# Patient Record
Sex: MALE | Race: WHITE | NOT HISPANIC OR LATINO | Employment: OTHER | URBAN - METROPOLITAN AREA
[De-identification: names, ages, dates, MRNs, and addresses within clinical notes are randomized per-mention and may not be internally consistent; named-entity substitution may affect disease eponyms.]

---

## 2017-11-11 ENCOUNTER — APPOINTMENT (EMERGENCY)
Dept: RADIOLOGY | Facility: HOSPITAL | Age: 77
End: 2017-11-11
Payer: MEDICARE

## 2017-11-11 ENCOUNTER — HOSPITAL ENCOUNTER (EMERGENCY)
Facility: HOSPITAL | Age: 77
Discharge: NURSING, REHAB OR LONG-TERM CARE | End: 2017-11-11
Attending: EMERGENCY MEDICINE | Admitting: EMERGENCY MEDICINE
Payer: MEDICARE

## 2017-11-11 VITALS
RESPIRATION RATE: 18 BRPM | HEART RATE: 102 BPM | OXYGEN SATURATION: 97 % | DIASTOLIC BLOOD PRESSURE: 74 MMHG | WEIGHT: 237.88 LBS | SYSTOLIC BLOOD PRESSURE: 135 MMHG | TEMPERATURE: 98.1 F

## 2017-11-11 DIAGNOSIS — S89.91XA INJURY OF RIGHT KNEE, INITIAL ENCOUNTER: Primary | ICD-10-CM

## 2017-11-11 PROCEDURE — 99284 EMERGENCY DEPT VISIT MOD MDM: CPT

## 2017-11-11 PROCEDURE — 73564 X-RAY EXAM KNEE 4 OR MORE: CPT

## 2017-11-11 RX ORDER — ACETAMINOPHEN 325 MG/1
650 TABLET ORAL EVERY 4 HOURS PRN
COMMUNITY

## 2017-11-11 RX ORDER — TAMSULOSIN HYDROCHLORIDE 0.4 MG/1
0.4 CAPSULE ORAL
COMMUNITY
End: 2018-06-25 | Stop reason: HOSPADM

## 2017-11-11 RX ORDER — AMLODIPINE BESYLATE 5 MG/1
5 TABLET ORAL DAILY
COMMUNITY

## 2017-11-11 RX ORDER — DOCUSATE SODIUM 100 MG/1
100 CAPSULE, LIQUID FILLED ORAL 2 TIMES DAILY
COMMUNITY

## 2017-11-11 RX ORDER — SENNA PLUS 8.6 MG/1
1 TABLET ORAL AS NEEDED
COMMUNITY

## 2017-11-11 RX ORDER — POLYETHYLENE GLYCOL 3350 17 G/17G
17 POWDER, FOR SOLUTION ORAL AS NEEDED
COMMUNITY

## 2017-11-11 RX ORDER — CITALOPRAM 10 MG/1
10 TABLET ORAL DAILY
COMMUNITY
End: 2018-06-25 | Stop reason: HOSPADM

## 2017-11-11 RX ORDER — PREDNISONE 1 MG/1
5 TABLET ORAL DAILY
COMMUNITY
End: 2018-06-25 | Stop reason: HOSPADM

## 2017-11-11 RX ORDER — COLCHICINE 0.6 MG/1
0.6 CAPSULE ORAL DAILY
COMMUNITY
End: 2018-06-25 | Stop reason: HOSPADM

## 2017-11-11 NOTE — ED PROVIDER NOTES
History  Chief Complaint   Patient presents with    Leg Injury     Aide at [facility] reports to EMS that patient has Hx of dementia and states tnat " he wanted to get out of there" and threw himself out of his chair onto the floor  Patient hit his R knee and leg, no LOC , no thinners  Pt  Got angry and threw himself out of the chair at the [facility]  C/o some R knee pain - has a hx of chronic R knee pain  He didn't hit his head, no headaches, no n/v   Pt  Is on xarelto  Prior to Admission Medications   Prescriptions Last Dose Informant Patient Reported? Taking?    Bisacodyl (DULCOLAX RE)   Yes Yes   Sig: Insert into the rectum daily as needed   Colchicine 0 6 MG CAPS   Yes Yes   Sig: Take 0 6 mg by mouth daily   Magnesium Hydroxide (MILK OF MAGNESIA PO)   Yes Yes   Sig: Take 30 mL by mouth as needed   acetaminophen (TYLENOL) 325 mg tablet   Yes Yes   Sig: Take 650 mg by mouth every 4 (four) hours as needed for mild pain   amLODIPine (NORVASC) 5 mg tablet   Yes Yes   Sig: Take 5 mg by mouth daily   citalopram (CeleXA) 10 mg tablet   Yes Yes   Sig: Take 10 mg by mouth daily   docusate sodium (COLACE) 100 mg capsule   Yes Yes   Sig: Take 100 mg by mouth 2 (two) times a day   glycerin, adult, (glycerin adult) 2 g rectal suppository   Yes Yes   Sig: Insert 1 suppository into the rectum as needed   polyethylene glycol (MIRALAX) 17 g packet   Yes Yes   Sig: Take 17 g by mouth as needed   predniSONE 5 mg tablet   Yes Yes   Sig: Take 5 mg by mouth daily   rivaroxaban (XARELTO) 20 mg tablet   Yes Yes   Sig: Take 20 mg by mouth daily with breakfast   senna (SENOKOT) 8 6 MG tablet   Yes Yes   Sig: Take 1 tablet by mouth as needed for constipation   tamsulosin (FLOMAX) 0 4 mg   Yes Yes   Sig: Take 0 4 mg by mouth daily at bedtime      Facility-Administered Medications: None       Past Medical History:   Diagnosis Date    Benign prostatic hyperplasia     Dementia     Difficulty walking     Dysphagia     Essential hypertension     Gout     Major depressive disorder     Metabolic encephalopathy     UTI (urinary tract infection)        No past surgical history on file  No family history on file  I have reviewed and agree with the history as documented  Social History   Substance Use Topics    Smoking status: Not on file    Smokeless tobacco: Not on file    Alcohol use Not on file        Review of Systems   Constitutional: Negative for appetite change, fatigue and fever  HENT: Negative for rhinorrhea and sore throat  Respiratory: Negative for cough, shortness of breath and wheezing  Cardiovascular: Negative for chest pain and leg swelling  Gastrointestinal: Negative for abdominal pain, diarrhea and vomiting  Genitourinary: Negative for dysuria and flank pain  Musculoskeletal: Negative for back pain and neck pain  Skin: Negative for rash  Neurological: Negative for syncope and headaches  Psychiatric/Behavioral:        Mood normal       Physical Exam  ED Triage Vitals   Temperature Pulse Respirations Blood Pressure SpO2   11/11/17 1330 11/11/17 1333 11/11/17 1333 11/11/17 1330 11/11/17 1333   98 1 °F (36 7 °C) 102 18 135/74 93 %      Temp Source Heart Rate Source Patient Position - Orthostatic VS BP Location FiO2 (%)   11/11/17 1330 -- 11/11/17 1330 11/11/17 1330 --   Oral  Lying Right arm       Pain Score       --                  Orthostatic Vital Signs  Vitals:    11/11/17 1330 11/11/17 1333   BP: 135/74    Pulse:  102   Patient Position - Orthostatic VS: Lying        Physical Exam   Constitutional: He is oriented to person, place, and time  He appears well-developed and well-nourished  HENT:   Head: Normocephalic and atraumatic  Neck: Normal range of motion  Neck supple  Cardiovascular: Normal rate and regular rhythm  Pulmonary/Chest: Effort normal and breath sounds normal  No respiratory distress  Abdominal: Soft  There is no tenderness     Musculoskeletal:   R knee mild tenderness, no swelling, FROM, +n/v intact   Neurological: He is alert and oriented to person, place, and time  Skin: Skin is warm and dry  Nursing note and vitals reviewed  ED Medications  Medications - No data to display    Diagnostic Studies  Results Reviewed     None                 XR knee 4+ views Right injury   Final Result by Van Rodriguez MD (11/11 1429)      No acute osseous abnormality  Workstation performed: [id number]                    Procedures  Procedures       Phone Contacts  ED Phone Contact    ED Course  ED Course                                MDM  Number of Diagnoses or Management Options  Injury of right knee, initial encounter:      Amount and/or Complexity of Data Reviewed  Tests in the radiology section of CPT®: ordered and reviewed    Risk of Complications, Morbidity, and/or Mortality  Presenting problems: moderate  General comments: R knee xray - neg  Pt  Doesn't want anything for pain because he states that he doesn't have any pain      CritCare Time    Disposition  Final diagnoses:   Injury of right knee, initial encounter     Time reflects when diagnosis was documented in both MDM as applicable and the Disposition within this note     Time User Action Codes Description Comment    11/11/2017  2:39 PM Trino Mike Add [[id number]] Injury of right knee, initial encounter       ED Disposition     ED Disposition Condition Comment    Discharge  [place] discharge to home/self care  Condition at discharge: Stable        Follow-up Information     Follow up With Specialties Details Why [address]    [address]  [place] [address] [place]  [phone]      [address] [facility]  [phone]        Patient's Medications   Discharge Prescriptions    No medications on file     No discharge procedures on file      ED Provider  Electronically Signed by           Brennon Stone MD  11/11/17 2602

## 2017-11-11 NOTE — DISCHARGE INSTRUCTIONS
Tylenol for pain     Contusion in Adults   WHAT YOU NEED TO KNOW:   A contusion is a bruise that appears on your skin after an injury  A bruise happens when small blood vessels tear but skin does not  When blood vessels tear, blood leaks into nearby tissue, such as soft tissue or muscle  DISCHARGE INSTRUCTIONS:   Return to the emergency department if:   · You have new trouble moving the injured area  · You have tingling or numbness in or near the injured area  · Your hand or foot below the bruise gets cold or turns pale  Contact your healthcare provider if:   · You find a new lump in the injured area  · Your symptoms do not improve with treatment after 4 to 5 days  · You have questions or concerns about your condition or care  Medicines: You may need any of the following:  · NSAIDs  help decrease swelling and pain or fever  This medicine is available with or without a doctor's order  NSAIDs can cause stomach bleeding or kidney problems in certain people  If you take blood thinner medicine, always ask your healthcare provider if NSAIDs are safe for you  Always read the medicine label and follow directions  · Prescription pain medicine  may be given  Do not wait until the pain is severe before you take your medicine  · Take your medicine as directed  Contact your healthcare provider if you think your medicine is not helping or if you have side effects  Tell him of her if you are allergic to any medicine  Keep a list of the medicines, vitamins, and herbs you take  Include the amounts, and when and why you take them  Bring the list or the pill bottles to follow-up visits  Carry your medicine list with you in case of an emergency  Follow up with your healthcare provider as directed: You may need to return within a week to check your injury again  Write down your questions so you remember to ask them during your visits    Help a contusion heal:   · Rest the injured area  or use it less than usual  If you bruised your leg or foot, you may need crutches or a cane to help you walk  This will help you keep weight off your injured body part  · Apply ice  to decrease swelling and pain  Ice may also help prevent tissue damage  Use an ice pack, or put crushed ice in a plastic bag  Cover it with a towel and place it on your bruise for 15 to 20 minutes every hour or as directed  · Use compression  to support the area and decrease swelling  Wrap an elastic bandage around the area over the bruised muscle  Make sure the bandage is not too tight  You should be able to fit 1 finger between the bandage and your skin  · Elevate (raise) your injured body part  above the level of your heart to help decrease pain and swelling  Use pillows, blankets, or rolled towels to elevate the area as often as you can  · Do not drink alcohol  as directed  Alcohol may slow healing  · Do not stretch injured muscles  right after your injury  Ask your healthcare provider when and how you may safely stretch after your injury  Gentle stretches can help increase your flexibility  · Do not massage the area or put heating pads  on the bruise right after your injury  Heat and massage may slow healing  Your healthcare provider may tell you to apply heat after several days  At that time, heat will start to help the injury heal   Prevent another contusion:   · Stretch and warm up before you play sports or exercise  · Wear protective gear when you play sports  Examples are shin guards and padding  · If you begin a new physical activity, start slowly to give your body a chance to adjust   © 2017 [address] [employer] Information is for End User's use only and may not be sold, redistributed or otherwise used for commercial purposes  All illustrations and images included in CareNotes® are the copyrighted property of [employer] [place] [employer]  or Himanshu Mayo  The above information is an  only   It is not intended as medical advice for individual conditions or treatments  Talk to your doctor, nurse or pharmacist before following any medical regimen to see if it is safe and effective for you

## 2017-11-11 NOTE — ED NOTES
Patient has BM in his brief and unwilling at first to be cleaned   Patient arguing that he dose not want to be changed but is agreeable and is cleaned and brief changed at this time     Maria Teresa Merlos, RN  11/11/17 7739

## 2017-11-11 NOTE — ED NOTES
Patient's family at bedside   Patient yelling out in his room prior to their arrival       Kalina Nunes RN  11/11/17 2235

## 2017-11-11 NOTE — ED NOTES
Patient has periods of agitation and yelling out  Patient alert to self but confused about situation   Patient not allowing RN to get vitals       Dilcia Brunner RN  11/11/17 1873

## 2018-06-10 ENCOUNTER — HOSPITAL ENCOUNTER (INPATIENT)
Dept: OTHER | Facility: HOSPITAL | Age: 78
LOS: 15 days | Discharge: NURSING, REHAB OR LONG-TERM CARE | DRG: 885 | End: 2018-06-25
Attending: PSYCHIATRY & NEUROLOGY | Admitting: PSYCHIATRY & NEUROLOGY
Payer: MEDICARE

## 2018-06-10 DIAGNOSIS — F31.32 BIPOLAR AFFECTIVE DISORDER, CURRENTLY DEPRESSED, MODERATE (HCC): Primary | ICD-10-CM

## 2018-06-10 PROCEDURE — 80329 ANALGESICS NON-OPIOID 1 OR 2: CPT

## 2018-06-10 PROCEDURE — 9990 CEPHALEXIN MH 500 MG TAB (3121357)

## 2018-06-10 PROCEDURE — 87088 URINE BACTERIA CULTURE: CPT

## 2018-06-10 PROCEDURE — 9990 ER-EKG (8417206)

## 2018-06-10 PROCEDURE — 9990 CSR 8BMC (7599970)

## 2018-06-10 PROCEDURE — 85025 COMPLETE CBC W/AUTO DIFF WBC: CPT

## 2018-06-10 PROCEDURE — 9990 ACETAMINOPHEN BLOOD (222398)

## 2018-06-10 PROCEDURE — 80053 COMPREHEN METABOLIC PANEL: CPT

## 2018-06-10 PROCEDURE — 9990 ALCOHOL, BLOOD (220640)

## 2018-06-10 PROCEDURE — 9990 DRUG TEST PRESUMP OPTICAL (229568)

## 2018-06-10 PROCEDURE — 80305 DRUG TEST PRSMV DIR OPT OBS: CPT

## 2018-06-10 PROCEDURE — 81002 URINALYSIS NONAUTO W/O SCOPE: CPT

## 2018-06-10 PROCEDURE — 87147 CULTURE TYPE IMMUNOLOGIC: CPT

## 2018-06-10 PROCEDURE — 9990

## 2018-06-10 PROCEDURE — 9990 BEHAVIORAL MEDICINE-SEMI PRIVATE (20149)

## 2018-06-10 PROCEDURE — 9990 EMERG CARE V (8411639)

## 2018-06-10 PROCEDURE — 9990 VALPROIC ACID (223248)

## 2018-06-10 PROCEDURE — 9990 ER-PULSE OXIMETRY (8417230)

## 2018-06-10 PROCEDURE — 87086 URINE CULTURE/COLONY COUNT: CPT

## 2018-06-10 PROCEDURE — 94760 N-INVAS EAR/PLS OXIMETRY 1: CPT

## 2018-06-10 PROCEDURE — 80164 ASSAY DIPROPYLACETIC ACD TOT: CPT

## 2018-06-10 PROCEDURE — 9990 SALICYLATE, BLOOD (220665)

## 2018-06-10 PROCEDURE — 9990 URINE, AEROBIC (262170)

## 2018-06-10 PROCEDURE — 9990 TRANSDUCER OXISENSOR ADULT DIS (8418626)

## 2018-06-10 PROCEDURE — 80178 ASSAY OF LITHIUM: CPT

## 2018-06-10 PROCEDURE — 87186 SC STD MICRODIL/AGAR DIL: CPT

## 2018-06-10 PROCEDURE — 93005 ELECTROCARDIOGRAM TRACING: CPT

## 2018-06-10 PROCEDURE — 80320 DRUG SCREEN QUANTALCOHOLS: CPT

## 2018-06-10 PROCEDURE — 9990 COMPREHENSIVE METABOLIC PANEL (227983)

## 2018-06-10 PROCEDURE — 9990 ER LEVEL 5 SUPPLIES (8428655)

## 2018-06-10 PROCEDURE — 9990 URINE, AEROBIC (261420)

## 2018-06-10 PROCEDURE — 9990 CBC WITH AUTOMATED DIF (230011)

## 2018-06-10 PROCEDURE — 99285 EMERGENCY DEPT VISIT HI MDM: CPT

## 2018-06-11 LAB
GLUCOSE SERPL-MCNC: 134 MG/DL (ref 70–99)
GLUCOSE SERPL-MCNC: 143 MG/DL (ref 70–99)
GLUCOSE SERPL-MCNC: 99 MG/DL (ref 70–99)

## 2018-06-11 PROCEDURE — 9990 CEPHALEXIN MH 500 MG TAB (3121357)

## 2018-06-11 PROCEDURE — 9990 GABAPENTIN 100 MG CAP (3120003)

## 2018-06-11 PROCEDURE — 9990 CENTRALIZED PATIENT TRANSPORT (5870530)

## 2018-06-11 PROCEDURE — 9990 GLUCOSE-POINT OF CARE (WHOLE BLD) (227876)

## 2018-06-11 PROCEDURE — 9990 CSR 8BMC (7599970)

## 2018-06-11 PROCEDURE — 9990 BEHAVIORAL MEDICINE-SEMI PRIVATE (20149)

## 2018-06-11 PROCEDURE — 82948 REAGENT STRIP/BLOOD GLUCOSE: CPT

## 2018-06-12 LAB
ANION GAP SERPL CALCULATED.3IONS-SCNC: 9 MMOL/L (ref 5–14)
BUN SERPL-MCNC: 33 MG/DL (ref 5–25)
CALCIUM SERPL-MCNC: 9.8 MG/DL (ref 8.4–10.2)
CHLORIDE SERPL-SCNC: 103 MEQ/L (ref 97–108)
CHOLEST SERPL-MCNC: 153 MG/DL
CHOLEST/HDLC SERPL: 5.5 {RATIO}
CO2 SERPL-SCNC: 27 MMOL/L (ref 22–30)
CREATINE, SERUM (HISTORICAL): 1.74 MG/DL (ref 0.7–1.5)
EGFR (HISTORICAL): 38 ML/MIN/1.73 M2
EST. AVERAGE GLUCOSE BLD GHB EST-MCNC: 128 MG/DL
GLUCOSE FASTING (HISTORICAL): 116 MG/DL (ref 70–99)
GLUCOSE SERPL-MCNC: 113 MG/DL (ref 70–99)
GLUCOSE SERPL-MCNC: 118 MG/DL (ref 70–99)
GLUCOSE SERPL-MCNC: 132 MG/DL (ref 70–99)
GLUCOSE SERPL-MCNC: 98 MG/DL (ref 70–99)
HBA1C MFR BLD HPLC: 6.1 %
HDLC SERPL-MCNC: 28 MG/DL
LDL/HDL RATIO (HISTORICAL): 3.4
LDLC SERPL CALC-MCNC: 95 MG/DL
POTASSIUM SERPL-SCNC: 4.5 MEQ/L (ref 3.6–5)
SODIUM SERPL-SCNC: 139 MEQ/L (ref 137–147)
T4 FREE SERPL-MCNC: 1.05 NG/DL (ref 0.78–2.19)
TRIGL SERPL-MCNC: 148 MG/DL
TSH SERPL DL<=0.05 MIU/L-ACNC: 6.57 UIU/ML (ref 0.47–4.68)
VLDLC SERPL CALC-MCNC: 30 MG/DL (ref 0–40)

## 2018-06-12 PROCEDURE — 80048 BASIC METABOLIC PNL TOTAL CA: CPT

## 2018-06-12 PROCEDURE — 9990 LIPID PANEL (227124)

## 2018-06-12 PROCEDURE — 9990 BEHAVIORAL MEDICINE-SEMI PRIVATE (20149)

## 2018-06-12 PROCEDURE — 9990 GLUCOSE-POINT OF CARE (WHOLE BLD) (227876)

## 2018-06-12 PROCEDURE — 9990 PHYSIO-VISIT STATISIC NO CHARGE (3711033)

## 2018-06-12 PROCEDURE — 9990

## 2018-06-12 PROCEDURE — 9990 TSH WITH REFLEX TO FREE T4 (229617)

## 2018-06-12 PROCEDURE — 9990 PHYSIO-THERAPEUTIC ACTIVITIES (3710985)

## 2018-06-12 PROCEDURE — 97530 THERAPEUTIC ACTIVITIES: CPT

## 2018-06-12 PROCEDURE — 9990 T4, FREE (SHH) (226142)

## 2018-06-12 PROCEDURE — 9990 GABAPENTIN 100 MG CAP (3120003)

## 2018-06-12 PROCEDURE — 83036 HEMOGLOBIN GLYCOSYLATED A1C: CPT

## 2018-06-12 PROCEDURE — 9990 CEPHALEXIN MH 500 MG TAB (3121357)

## 2018-06-12 PROCEDURE — 9990 CSR 8BMC (7599970)

## 2018-06-12 PROCEDURE — 84439 ASSAY OF FREE THYROXINE: CPT

## 2018-06-12 PROCEDURE — 93970 EXTREMITY STUDY: CPT

## 2018-06-12 PROCEDURE — 9990 CENTRALIZED PATIENT TRANSPORT (5870530)

## 2018-06-12 PROCEDURE — 9990 PHYSIO HIGH COMPLEX EVAL (3711158)

## 2018-06-12 PROCEDURE — 9990 BASIC METABOLIC PANEL (227975)

## 2018-06-12 PROCEDURE — 97163 PT EVAL HIGH COMPLEX 45 MIN: CPT

## 2018-06-12 PROCEDURE — 80061 LIPID PANEL: CPT

## 2018-06-12 PROCEDURE — 84443 ASSAY THYROID STIM HORMONE: CPT

## 2018-06-12 PROCEDURE — 82948 REAGENT STRIP/BLOOD GLUCOSE: CPT

## 2018-06-13 LAB
GLUCOSE SERPL-MCNC: 138 MG/DL (ref 70–99)
GLUCOSE SERPL-MCNC: 94 MG/DL (ref 70–99)
GLUCOSE SERPL-MCNC: 97 MG/DL (ref 70–99)

## 2018-06-13 PROCEDURE — 9990 BEHAVIORAL MEDICINE-SEMI PRIVATE (20149)

## 2018-06-13 PROCEDURE — 82948 REAGENT STRIP/BLOOD GLUCOSE: CPT

## 2018-06-13 PROCEDURE — 9990 OT MOD COMPLEX EVAL (4420285)

## 2018-06-13 PROCEDURE — 9990 PHYSIO-EXERCISE I PER 15 MIN (3710068)

## 2018-06-13 PROCEDURE — 9990 CEPHALEXIN MH 500 MG TAB (3121357)

## 2018-06-13 PROCEDURE — 97530 THERAPEUTIC ACTIVITIES: CPT

## 2018-06-13 PROCEDURE — 9990 PHYSIO-VISIT STATISIC NO CHARGE (3711033)

## 2018-06-13 PROCEDURE — 97166 OT EVAL MOD COMPLEX 45 MIN: CPT

## 2018-06-13 PROCEDURE — 9990 CSR 8BMC (7599970)

## 2018-06-13 PROCEDURE — 9990 OT-VISIT STATISTIC NO CHARGE (4420105)

## 2018-06-13 PROCEDURE — 9990 GABAPENTIN 100 MG CAP (3120003)

## 2018-06-13 PROCEDURE — 97110 THERAPEUTIC EXERCISES: CPT

## 2018-06-13 PROCEDURE — 9990 GLUCOSE-POINT OF CARE (WHOLE BLD) (227876)

## 2018-06-13 PROCEDURE — 9990 PHYSIO-THERAPEUTIC ACTIVITIES (3710985)

## 2018-06-14 LAB
GLUCOSE SERPL-MCNC: 111 MG/DL (ref 70–99)
GLUCOSE SERPL-MCNC: 93 MG/DL (ref 70–99)
GLUCOSE SERPL-MCNC: 98 MG/DL (ref 70–99)

## 2018-06-14 PROCEDURE — 9990 CEPHALEXIN MH 500 MG TAB (3121357)

## 2018-06-14 PROCEDURE — 9990 PHYSIO-THERAPEUTIC ACTIVITIES (3710985)

## 2018-06-14 PROCEDURE — 9990 GLUCOSE-POINT OF CARE (WHOLE BLD) (227876)

## 2018-06-14 PROCEDURE — 9990 PHYSIO-VISIT STATISIC NO CHARGE (3711033)

## 2018-06-14 PROCEDURE — 97110 THERAPEUTIC EXERCISES: CPT

## 2018-06-14 PROCEDURE — 97530 THERAPEUTIC ACTIVITIES: CPT

## 2018-06-14 PROCEDURE — 9990 BEHAVIORAL MEDICINE-SEMI PRIVATE (20149)

## 2018-06-14 PROCEDURE — 82948 REAGENT STRIP/BLOOD GLUCOSE: CPT

## 2018-06-14 PROCEDURE — 9990 PHYSIO-EXERCISE I PER 15 MIN (3710068)

## 2018-06-14 PROCEDURE — 9990 CSR 8BMC (7599970)

## 2018-06-15 LAB
ABSOL LYMPHOCYTES (HISTORICAL): 2.3 K/UL (ref 0.5–4)
ANION GAP SERPL CALCULATED.3IONS-SCNC: 10 MMOL/L (ref 5–14)
BASOPHILS # BLD AUTO: 0 K/UL (ref 0–0.1)
BASOPHILS # BLD AUTO: 1 % (ref 0–1)
BUN SERPL-MCNC: 39 MG/DL (ref 5–25)
CALCIUM SERPL-MCNC: 9.3 MG/DL (ref 8.4–10.2)
CHLORIDE SERPL-SCNC: 105 MEQ/L (ref 97–108)
CO2 SERPL-SCNC: 23 MMOL/L (ref 22–30)
CREATINE, SERUM (HISTORICAL): 1.71 MG/DL (ref 0.7–1.5)
DEPRECATED RDW RBC AUTO: 14.9 %
EGFR (HISTORICAL): 39 ML/MIN/1.73 M2
EOSINOPHIL # BLD AUTO: 0.5 K/UL (ref 0–0.4)
EOSINOPHIL NFR BLD AUTO: 6 % (ref 0–6)
GLUCOSE FASTING (HISTORICAL): 69 MG/DL (ref 70–99)
GLUCOSE SERPL-MCNC: 110 MG/DL (ref 70–99)
GLUCOSE SERPL-MCNC: 115 MG/DL (ref 70–99)
GLUCOSE SERPL-MCNC: 118 MG/DL (ref 70–99)
GLUCOSE SERPL-MCNC: 88 MG/DL (ref 70–99)
HCT VFR BLD AUTO: 40.8 % (ref 41–53)
HGB BLD-MCNC: 13.3 G/DL (ref 13.5–17.5)
LYMPHOCYTES NFR BLD AUTO: 27 % (ref 25–45)
MCH RBC QN AUTO: 30.9 PG (ref 26–34)
MCHC RBC AUTO-ENTMCNC: 32.5 % (ref 31–36)
MCV RBC AUTO: 95 FL (ref 80–100)
MONOCYTES # BLD AUTO: 0.7 K/UL (ref 0.2–0.9)
MONOCYTES NFR BLD AUTO: 9 % (ref 1–10)
NEUTROPHILS ABS COUNT (HISTORICAL): 4.8 K/UL (ref 1.8–7.8)
NEUTS SEG NFR BLD AUTO: 57 % (ref 45–65)
PLATELET # BLD AUTO: 250 K/MCL (ref 150–450)
POTASSIUM SERPL-SCNC: 4.5 MEQ/L (ref 3.6–5)
RBC # BLD AUTO: 4.3 M/MCL (ref 4.5–5.9)
SODIUM SERPL-SCNC: 138 MEQ/L (ref 137–147)
WBC # BLD AUTO: 8.3 K/MCL (ref 4.5–11)

## 2018-06-15 PROCEDURE — 9990 PHYSIO-VISIT STATISIC NO CHARGE (3711033)

## 2018-06-15 PROCEDURE — 97110 THERAPEUTIC EXERCISES: CPT

## 2018-06-15 PROCEDURE — 9990 BEHAVIORAL MEDICINE-SEMI PRIVATE (20149)

## 2018-06-15 PROCEDURE — 9990 BASIC METABOLIC PANEL (227975)

## 2018-06-15 PROCEDURE — 85025 COMPLETE CBC W/AUTO DIFF WBC: CPT

## 2018-06-15 PROCEDURE — 9990 GLUCOSE-POINT OF CARE (WHOLE BLD) (227876)

## 2018-06-15 PROCEDURE — 82948 REAGENT STRIP/BLOOD GLUCOSE: CPT

## 2018-06-15 PROCEDURE — 9990

## 2018-06-15 PROCEDURE — 9990 PHYSIO-EXERCISE I PER 15 MIN (3710068)

## 2018-06-15 PROCEDURE — 9990 CBC WITH AUTOMATED DIF (230011)

## 2018-06-15 PROCEDURE — 9990 CEPHALEXIN MH 500 MG TAB (3121357)

## 2018-06-15 PROCEDURE — 9990 PHYSIO-THERAPEUTIC ACTIVITIES (3710985)

## 2018-06-15 PROCEDURE — 80048 BASIC METABOLIC PNL TOTAL CA: CPT

## 2018-06-15 PROCEDURE — 9990 CSR 8BMC (7599970)

## 2018-06-15 PROCEDURE — 97530 THERAPEUTIC ACTIVITIES: CPT

## 2018-06-16 LAB
GLUCOSE SERPL-MCNC: 104 MG/DL (ref 70–99)
GLUCOSE SERPL-MCNC: 116 MG/DL (ref 70–99)
GLUCOSE SERPL-MCNC: 140 MG/DL (ref 70–99)

## 2018-06-16 PROCEDURE — 9990 GLUCOSE-POINT OF CARE (WHOLE BLD) (227876)

## 2018-06-16 PROCEDURE — 9990 CEPHALEXIN MH 500 MG TAB (3121357)

## 2018-06-16 PROCEDURE — 9990 BEHAVIORAL MEDICINE-SEMI PRIVATE (20149)

## 2018-06-16 PROCEDURE — 82948 REAGENT STRIP/BLOOD GLUCOSE: CPT

## 2018-06-16 PROCEDURE — 9990 CSR 8BMC (7599970)

## 2018-06-17 LAB
GLUCOSE SERPL-MCNC: 130 MG/DL (ref 70–99)
GLUCOSE SERPL-MCNC: 134 MG/DL (ref 70–99)
GLUCOSE SERPL-MCNC: 92 MG/DL (ref 70–99)
GLUCOSE SERPL-MCNC: 99 MG/DL (ref 70–99)

## 2018-06-17 PROCEDURE — 9990 CSR 8BMC (7599970)

## 2018-06-17 PROCEDURE — 9990 BEHAVIORAL MEDICINE-SEMI PRIVATE (20149)

## 2018-06-17 PROCEDURE — 9990 GLUCOSE-POINT OF CARE (WHOLE BLD) (227876)

## 2018-06-17 PROCEDURE — 82948 REAGENT STRIP/BLOOD GLUCOSE: CPT

## 2018-06-18 LAB
GLUCOSE SERPL-MCNC: 103 MG/DL (ref 70–99)
GLUCOSE SERPL-MCNC: 132 MG/DL (ref 70–99)
GLUCOSE SERPL-MCNC: 147 MG/DL (ref 70–99)
GLUCOSE SERPL-MCNC: 93 MG/DL (ref 70–99)

## 2018-06-18 PROCEDURE — 9990 CSR 8BMC (7599970)

## 2018-06-18 PROCEDURE — 9990 GLUCOSE-POINT OF CARE (WHOLE BLD) (227876)

## 2018-06-18 PROCEDURE — 9990

## 2018-06-18 PROCEDURE — 82948 REAGENT STRIP/BLOOD GLUCOSE: CPT

## 2018-06-18 PROCEDURE — 9990 GABAPENTIN 300 MG CAP (3119476)

## 2018-06-18 PROCEDURE — 9990 BEHAVIORAL MEDICINE-SEMI PRIVATE (20149)

## 2018-06-18 PROCEDURE — 9990 ENOXAPARIN SODIUM 40MG/0.4/ML (3127693)

## 2018-06-19 LAB
GLUCOSE SERPL-MCNC: 102 MG/DL (ref 70–99)
GLUCOSE SERPL-MCNC: 108 MG/DL (ref 70–99)
GLUCOSE SERPL-MCNC: 99 MG/DL (ref 70–99)

## 2018-06-19 PROCEDURE — 82948 REAGENT STRIP/BLOOD GLUCOSE: CPT

## 2018-06-19 PROCEDURE — 9990 CSR 8BMC (7599970)

## 2018-06-19 PROCEDURE — 9990 AMLODIPINE BESYLATE 5 MG TAB (3119138)

## 2018-06-19 PROCEDURE — 9990 GLUCOSE-POINT OF CARE (WHOLE BLD) (227876)

## 2018-06-19 PROCEDURE — 9990 ENOXAPARIN SODIUM 40MG/0.4/ML (3127693)

## 2018-06-19 PROCEDURE — 9990

## 2018-06-19 PROCEDURE — 9990 BEHAVIORAL MEDICINE-SEMI PRIVATE (20149)

## 2018-06-20 LAB
ABSOL LYMPHOCYTES (HISTORICAL): 1.4 K/UL (ref 0.5–4)
ANION GAP SERPL CALCULATED.3IONS-SCNC: 7 MMOL/L (ref 5–14)
BASOPHILS # BLD AUTO: 0.1 K/UL (ref 0–0.1)
BASOPHILS # BLD AUTO: 1 % (ref 0–1)
BUN SERPL-MCNC: 37 MG/DL (ref 5–25)
CALCIUM SERPL-MCNC: 9.6 MG/DL (ref 8.4–10.2)
CHLORIDE SERPL-SCNC: 103 MEQ/L (ref 97–108)
CO2 SERPL-SCNC: 28 MMOL/L (ref 22–30)
CREATINE, SERUM (HISTORICAL): 2.01 MG/DL (ref 0.7–1.5)
DEPRECATED RDW RBC AUTO: 14.8 %
EGFR (HISTORICAL): 32 ML/MIN/1.73 M2
EOSINOPHIL # BLD AUTO: 0.2 K/UL (ref 0–0.4)
EOSINOPHIL NFR BLD AUTO: 3 % (ref 0–6)
GLUCOSE FASTING (HISTORICAL): 100 MG/DL (ref 70–99)
GLUCOSE SERPL-MCNC: 145 MG/DL (ref 70–99)
HCT VFR BLD AUTO: 43.6 % (ref 41–53)
HGB BLD-MCNC: 14.3 G/DL (ref 13.5–17.5)
LYMPHOCYTES NFR BLD AUTO: 16 % (ref 25–45)
MCH RBC QN AUTO: 31.2 PG (ref 26–34)
MCHC RBC AUTO-ENTMCNC: 32.9 % (ref 31–36)
MCV RBC AUTO: 95 FL (ref 80–100)
MONOCYTES # BLD AUTO: 0.6 K/UL (ref 0.2–0.9)
MONOCYTES NFR BLD AUTO: 7 % (ref 1–10)
NEUTROPHILS ABS COUNT (HISTORICAL): 6.5 K/UL (ref 1.8–7.8)
NEUTS SEG NFR BLD AUTO: 73 % (ref 45–65)
PLATELET # BLD AUTO: 257 K/MCL (ref 150–450)
POTASSIUM SERPL-SCNC: 4.1 MEQ/L (ref 3.6–5)
RBC # BLD AUTO: 4.6 M/MCL (ref 4.5–5.9)
SODIUM SERPL-SCNC: 137 MEQ/L (ref 137–147)
T4 FREE SERPL-MCNC: 1.27 NG/DL (ref 0.78–2.19)
TSH SERPL DL<=0.05 MIU/L-ACNC: 7.64 UIU/ML (ref 0.47–4.68)
WBC # BLD AUTO: 8.7 K/MCL (ref 4.5–11)

## 2018-06-20 PROCEDURE — 9990 TSH WITH REFLEX TO FREE T4 (229617)

## 2018-06-20 PROCEDURE — 84439 ASSAY OF FREE THYROXINE: CPT

## 2018-06-20 PROCEDURE — 9990 BASIC METABOLIC PANEL (227975)

## 2018-06-20 PROCEDURE — 9990 CSR 8BMC (7599970)

## 2018-06-20 PROCEDURE — 9990 BEHAVIORAL MEDICINE-SEMI PRIVATE (20149)

## 2018-06-20 PROCEDURE — 9990 CBC WITH AUTOMATED DIF (230011)

## 2018-06-20 PROCEDURE — 82948 REAGENT STRIP/BLOOD GLUCOSE: CPT

## 2018-06-20 PROCEDURE — 80048 BASIC METABOLIC PNL TOTAL CA: CPT

## 2018-06-20 PROCEDURE — 9990 OT-VISIT STATISTIC NO CHARGE (4420105)

## 2018-06-20 PROCEDURE — 84443 ASSAY THYROID STIM HORMONE: CPT

## 2018-06-20 PROCEDURE — 85025 COMPLETE CBC W/AUTO DIFF WBC: CPT

## 2018-06-20 PROCEDURE — 97150 GROUP THERAPEUTIC PROCEDURES: CPT

## 2018-06-20 PROCEDURE — 9990 AMLODIPINE BESYLATE 5 MG TAB (3119138)

## 2018-06-20 PROCEDURE — 9990 GLUCOSE-POINT OF CARE (WHOLE BLD) (227876)

## 2018-06-20 PROCEDURE — 9990

## 2018-06-20 PROCEDURE — 9990 T4, FREE (SHH) (226142)

## 2018-06-20 PROCEDURE — 9990 PANTOPRAZOLE 40MG TAB (3125085)

## 2018-06-20 PROCEDURE — 9990 OCCUPATIONAL THERAPY-GRP 30 (4420048)

## 2018-06-21 LAB
ANION GAP SERPL CALCULATED.3IONS-SCNC: 11 MMOL/L (ref 5–14)
BUN SERPL-MCNC: 30 MG/DL (ref 5–25)
CALCIUM SERPL-MCNC: 9.9 MG/DL (ref 8.4–10.2)
CHLORIDE SERPL-SCNC: 106 MEQ/L (ref 97–108)
CO2 SERPL-SCNC: 24 MMOL/L (ref 22–30)
CREATINE, SERUM (HISTORICAL): 1.6 MG/DL (ref 0.7–1.5)
EGFR (HISTORICAL): 42 ML/MIN/1.73 M2
GLUCOSE FASTING (HISTORICAL): 135 MG/DL (ref 70–99)
POTASSIUM SERPL-SCNC: 4.4 MEQ/L (ref 3.6–5)
SODIUM SERPL-SCNC: 140 MEQ/L (ref 137–147)

## 2018-06-21 PROCEDURE — 80048 BASIC METABOLIC PNL TOTAL CA: CPT

## 2018-06-21 PROCEDURE — 9990 CSR 8BMC (7599970)

## 2018-06-21 PROCEDURE — 9990 AMLODIPINE BESYLATE 5 MG TAB (3119138)

## 2018-06-21 PROCEDURE — 9990 BASIC METABOLIC PANEL (227975)

## 2018-06-21 PROCEDURE — 9990 ENOXAPARIN SODIUM 40MG/0.4/ML (3127693)

## 2018-06-21 PROCEDURE — 9990 BEHAVIORAL MEDICINE-SEMI PRIVATE (20149)

## 2018-06-21 PROCEDURE — 9990

## 2018-06-21 PROCEDURE — 9990 PANTOPRAZOLE 40MG TAB (3125085)

## 2018-06-22 PROCEDURE — 9990 BEHAVIORAL MEDICINE-SEMI PRIVATE (20149)

## 2018-06-22 PROCEDURE — 97110 THERAPEUTIC EXERCISES: CPT

## 2018-06-22 PROCEDURE — 9990 ENOXAPARIN SODIUM 40MG/0.4/ML (3127693)

## 2018-06-22 PROCEDURE — 9990 PANTOPRAZOLE 40MG TAB (3125085)

## 2018-06-22 PROCEDURE — 97530 THERAPEUTIC ACTIVITIES: CPT

## 2018-06-22 PROCEDURE — 9990 PHYSIO-VISIT STATISIC NO CHARGE (3711033)

## 2018-06-22 PROCEDURE — 9990 OT-VISIT STATISTIC NO CHARGE (4420105)

## 2018-06-22 PROCEDURE — 97150 GROUP THERAPEUTIC PROCEDURES: CPT

## 2018-06-22 PROCEDURE — 9990 PHYSIO-THERAPEUTIC ACTIVITIES (3710985)

## 2018-06-22 PROCEDURE — 9990 CSR 8BMC (7599970)

## 2018-06-22 PROCEDURE — 9990 PHYSIO-EXERCISE I PER 15 MIN (3710068)

## 2018-06-22 PROCEDURE — 9990 AMLODIPINE BESYLATE 5 MG TAB (3119138)

## 2018-06-22 PROCEDURE — 9990

## 2018-06-22 PROCEDURE — 9990 OCCUPATIONAL THERAPY-GRP 30 (4420048)

## 2018-06-22 RX ORDER — SODIUM PHOSPHATE,MONO-DIBASIC 19G-7G/118
1 ENEMA (ML) RECTAL DAILY PRN
Status: DISCONTINUED | OUTPATIENT
Start: 2018-06-23 | End: 2018-06-25 | Stop reason: HOSPADM

## 2018-06-22 RX ORDER — OLANZAPINE 10 MG/1
5 INJECTION, POWDER, LYOPHILIZED, FOR SOLUTION INTRAMUSCULAR EVERY 4 HOURS PRN
Status: DISCONTINUED | OUTPATIENT
Start: 2018-06-23 | End: 2018-06-25 | Stop reason: HOSPADM

## 2018-06-22 RX ORDER — LORAZEPAM 0.5 MG/1
0.5 TABLET ORAL EVERY 4 HOURS PRN
Status: DISCONTINUED | OUTPATIENT
Start: 2018-06-23 | End: 2018-06-25 | Stop reason: HOSPADM

## 2018-06-22 RX ORDER — ACETAMINOPHEN 325 MG/1
325 TABLET ORAL EVERY 6 HOURS PRN
Status: DISCONTINUED | OUTPATIENT
Start: 2018-06-23 | End: 2018-06-25 | Stop reason: HOSPADM

## 2018-06-22 RX ORDER — LORAZEPAM 2 MG/ML
0.5 INJECTION INTRAMUSCULAR EVERY 4 HOURS PRN
Status: DISCONTINUED | OUTPATIENT
Start: 2018-06-23 | End: 2018-06-25 | Stop reason: HOSPADM

## 2018-06-22 RX ORDER — ZOLPIDEM TARTRATE 5 MG/1
5 TABLET ORAL
Status: DISCONTINUED | OUTPATIENT
Start: 2018-06-23 | End: 2018-06-25 | Stop reason: HOSPADM

## 2018-06-22 RX ORDER — AMLODIPINE BESYLATE 5 MG/1
5 TABLET ORAL DAILY
Status: DISCONTINUED | OUTPATIENT
Start: 2018-06-23 | End: 2018-06-25 | Stop reason: HOSPADM

## 2018-06-22 RX ORDER — OLANZAPINE 5 MG/1
5 TABLET ORAL EVERY 4 HOURS PRN
Status: DISCONTINUED | OUTPATIENT
Start: 2018-06-23 | End: 2018-06-25 | Stop reason: HOSPADM

## 2018-06-22 RX ORDER — PANTOPRAZOLE SODIUM 40 MG/1
40 TABLET, DELAYED RELEASE ORAL
Status: DISCONTINUED | OUTPATIENT
Start: 2018-06-23 | End: 2018-06-25 | Stop reason: HOSPADM

## 2018-06-23 PROBLEM — F31.32 BIPOLAR AFFECTIVE DISORDER, CURRENTLY DEPRESSED, MODERATE (HCC): Status: ACTIVE | Noted: 2018-06-23

## 2018-06-23 RX ORDER — PANTOPRAZOLE SODIUM 40 MG/1
40 TABLET, DELAYED RELEASE ORAL DAILY
COMMUNITY

## 2018-06-23 RX ORDER — ZOLPIDEM TARTRATE 6.25 MG/1
5 TABLET, FILM COATED, EXTENDED RELEASE ORAL
COMMUNITY
End: 2018-06-25 | Stop reason: HOSPADM

## 2018-06-23 RX ORDER — OLANZAPINE 5 MG/1
5 TABLET ORAL
COMMUNITY
End: 2018-06-25 | Stop reason: HOSPADM

## 2018-06-23 RX ORDER — LORAZEPAM 0.5 MG/1
0.5 TABLET ORAL 4 TIMES DAILY PRN
COMMUNITY
End: 2018-06-25 | Stop reason: HOSPADM

## 2018-06-23 RX ADMIN — LURASIDONE HYDROCHLORIDE 60 MG: 40 TABLET, FILM COATED ORAL at 16:27

## 2018-06-23 RX ADMIN — ENOXAPARIN SODIUM 40 MG: 40 INJECTION SUBCUTANEOUS at 09:17

## 2018-06-23 RX ADMIN — AMLODIPINE BESYLATE 5 MG: 5 TABLET ORAL at 09:17

## 2018-06-23 RX ADMIN — PANTOPRAZOLE SODIUM 40 MG: 40 TABLET, DELAYED RELEASE ORAL at 09:15

## 2018-06-23 NOTE — PROGRESS NOTES
Psychiatry Progress Note    Subjective: Interval History     Patient is seen resting comfortably in bed eating breakfast this morning  Appetite is reported as good patient completed on% of his breakfast   Patient continues to be somewhat aloof throughout the interview however reports his mood as good  Patient currently denying any significant depressive symptoms  He is denying any suicidal ideation  Is tolerating all his medications well without side effects        Current medications:    Current Facility-Administered Medications:     acetaminophen (TYLENOL) tablet 325 mg, 325 mg, Oral, Q6H PRN, Provider Cutover    amLODIPine (NORVASC) tablet 5 mg, 5 mg, Oral, Daily, Provider Cutover, 5 mg at 06/23/18 0539    bisacodyl (DULCOLAX) EC tablet 10 mg, 10 mg, Oral, Daily PRN, Provider Cutover    enoxaparin (LOVENOX) subcutaneous injection 40 mg, 40 mg, Subcutaneous, Q24H [facility], Provider Cutover, 40 mg at 06/23/18 0917    LORazepam (ATIVAN) 2 mg/mL injection 0 5 mg, 0 5 mg, Intramuscular, Q4H PRN, Provider Cutover    LORazepam (ATIVAN) tablet 0 5 mg, 0 5 mg, Oral, Q4H PRN, Provider Cutover    lurasidone (LATUDA) tablet 60 mg, 60 mg, Oral, Daily, Provider Cutover    magnesium hydroxide (MILK OF MAGNESIA) 400 mg/5 mL oral suspension 30 mL, 30 mL, Oral, Daily PRN, Provider Cutover    OLANZapine (ZyPREXA) IM injection 5 mg, 5 mg, Intramuscular, Q4H PRN, Provider Cutover    OLANZapine (ZyPREXA) tablet 5 mg, 5 mg, Oral, Q4H PRN, Provider Cutover    pantoprazole (PROTONIX) EC tablet 40 mg, 40 mg, Oral, Daily Before Breakfast, Provider Cutover, 40 mg at 06/23/18 0915    sodium phosphate-biphosphate (FLEET) enema 1 enema, 1 enema, Rectal, Daily PRN, Provider Cutover    zolpidem (AMBIEN) tablet 5 mg, 5 mg, Oral, HS PRN, Provider Cutover      Objective:     Vital Signs:  Vitals:    06/22/18 0900 06/23/18 0645 06/23/18 1300   BP:  109/64    BP Location:  Left arm    Pulse:  70    Resp:  20    Temp:  (!) 96 6 °F (35 9 °C)    TempSrc:  Temporal    SpO2:  96%    Weight: 101 kg (222 lb)  99 8 kg (220 lb)   Height: 5' 10" (1 778 m)           Appearance:  age appropriate and casually dressed   Behavior:  normal   Speech:  normal volume   Mood:  depressed   Affect:  constricted   Thought Process:  normal   Thought Content:  normal   Perceptual Disturbances: None   Risk Potential: none   Sensorium:  person, place, situation and time   Cognition:  intact   Consciousness:  alert and awake    Attention: attention span and concentration were age appropriate   Intellect: average   Insight:  good   Judgment: good   Motor Activity: no abnormal movements         Recent Labs:  Results Reviewed     None          I/O Past 24 hours:  No intake/output data recorded  I/O this shift:  In: 600 [P O :600]  Out: -         Assessment / Plan:     Bipolar affective disorder, currently depressed, moderate ([facility] [address] )    Recommended Treatment:      Medication changes:  1) continue current medications    Non-pharmacological treatments  1) Continue with group therapy, milieu therapy and occupational therapy  Safety  1) Safety/communication plan established targeting dynamic risk factors above  Counseling / Coordination of Care    Total floor / unit time spent today 20 minutes  Greater than 50% of total time was spent with the patient and / or family counseling and / or coordination of care  A description of the counseling / coordination of care  Patient's Rights, confidentiality and exceptions to confidentiality, use of automated medical record, [facility]  flor staff access to medical record, and consent to treatment reviewed      Wade Webb PA-C

## 2018-06-23 NOTE — NURSING NOTE
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

## 2018-06-23 NOTE — PLAN OF CARE
Problem: ANXIETY  Goal: Will report anxiety at manageable levels  INTERVENTIONS:  - Administer medication as ordered  - Teach and encourage coping skills  - Provide emotional support  - Assess patient/family for anxiety and ability to cope   Patient reports no anxiety today and was calm when assessed this morning

## 2018-06-24 RX ADMIN — AMLODIPINE BESYLATE 5 MG: 5 TABLET ORAL at 09:53

## 2018-06-24 RX ADMIN — LURASIDONE HYDROCHLORIDE 60 MG: 40 TABLET, FILM COATED ORAL at 17:31

## 2018-06-24 RX ADMIN — ENOXAPARIN SODIUM 40 MG: 40 INJECTION SUBCUTANEOUS at 09:53

## 2018-06-24 NOTE — PROGRESS NOTES
Psychiatry Progress Note    Subjective: Interval History     Patient again is seen resting comfortably in bed  He is enjoying his breakfast this morning  Denies any depressive symptoms  He is hopeful for discharge soon  Slept well last night  Tolerating all his medications well without side effects        Current medications:    Current Facility-Administered Medications:     acetaminophen (TYLENOL) tablet 325 mg, 325 mg, Oral, Q6H PRN, Provider Cutover    amLODIPine (NORVASC) tablet 5 mg, 5 mg, Oral, Daily, Provider Cutover, 5 mg at 06/23/18 5928    bisacodyl (DULCOLAX) EC tablet 10 mg, 10 mg, Oral, Daily PRN, Provider Cutover    enoxaparin (LOVENOX) subcutaneous injection 40 mg, 40 mg, Subcutaneous, Q24H [address], Provider Cutover, 40 mg at 06/23/18 0917    LORazepam (ATIVAN) 2 mg/mL injection 0 5 mg, 0 5 mg, Intramuscular, Q4H PRN, Provider Cutover    LORazepam (ATIVAN) tablet 0 5 mg, 0 5 mg, Oral, Q4H PRN, Provider Cutover    lurasidone (LATUDA) tablet 60 mg, 60 mg, Oral, Daily, Provider Cutover, 60 mg at 06/23/18 1627    magnesium hydroxide (MILK OF MAGNESIA) 400 mg/5 mL oral suspension 30 mL, 30 mL, Oral, Daily PRN, Provider Cutover    OLANZapine (ZyPREXA) IM injection 5 mg, 5 mg, Intramuscular, Q4H PRN, Provider Cutover    OLANZapine (ZyPREXA) tablet 5 mg, 5 mg, Oral, Q4H PRN, Provider Cutover    pantoprazole (PROTONIX) EC tablet 40 mg, 40 mg, Oral, Daily Before Breakfast, Provider Cutover, 40 mg at 06/23/18 0915    sodium phosphate-biphosphate (FLEET) enema 1 enema, 1 enema, Rectal, Daily PRN, Provider Cutover    zolpidem (AMBIEN) tablet 5 mg, 5 mg, Oral, HS PRN, Provider Cutover      Objective:     Vital Signs:  Vitals:    06/23/18 0645 06/23/18 1300 06/23/18 1550 06/24/18 0644   BP: 109/64  163/76 164/74   BP Location: Left arm  Left arm Left arm   Pulse: 70  75 80   Resp: 20  18 18   Temp: (!) 96 6 °F (35 9 °C)  (!) 97 °F (36 1 °C) (!) 96 °F (35 6 °C)   TempSrc: Temporal  Temporal Temporal SpO2: 96%   95%   Weight:  99 8 kg (220 lb)     Height:             Appearance:  age appropriate and casually dressed   Behavior:  normal   Speech:  normal volume   Mood:  depressed   Affect:  constricted   Thought Process:  normal   Thought Content:  normal   Perceptual Disturbances: None   Risk Potential: none   Sensorium:  person, place, situation and time   Cognition:  intact   Consciousness:  alert and awake    Attention: attention span and concentration were age appropriate   Intellect: average   Insight:  good   Judgment: good   Motor Activity: no abnormal movements         Recent Labs:  Results Reviewed     None          I/O Past 24 hours:  I/O last 3 completed shifts: In: 840 [P O :840]  Out: -   I/O this shift:  In: 300 [P O :300]  Out: -         Assessment / Plan:     Bipolar affective disorder, currently depressed, moderate (HCC)    Recommended Treatment:      Medication changes:  1) continue current medications    Non-pharmacological treatments  1) Continue with group therapy, milieu therapy and occupational therapy  Safety  1) Safety/communication plan established targeting dynamic risk factors above  Counseling / Coordination of Care    Total floor / unit time spent today 20 minutes  Greater than 50% of total time was spent with the patient and / or family counseling and / or coordination of care  A description of the counseling / coordination of care  Patient's Rights, confidentiality and exceptions to confidentiality, use of automated medical record, [facility]  flor staff access to medical record, and consent to treatment reviewed      Gabe Dc PA-C

## 2018-06-24 NOTE — PROGRESS NOTES
Pt isolative to room  Cooperative with irritable edge  Denies pain  Denies SIs at this time, will continue to monitor on SP1 precautions for safety and support

## 2018-06-24 NOTE — PROGRESS NOTES
Patient is in his bed  Patient appears to be sleeping and in no distress  Patient remains free from self harm  Patient is sp1 and will continue to monitor

## 2018-06-24 NOTE — PLAN OF CARE
Problem: PAIN - ADULT  Goal: Verbalizes/displays adequate comfort level or baseline comfort level  Interventions:  - Encourage patient to monitor pain and request assistance  - Assess pain using appropriate pain scale  - Administer analgesics based on type and severity of pain and evaluate response  - Implement non-pharmacological measures as appropriate and evaluate response  - Consider cultural and social influences on pain and pain management  - Notify physician/advanced practitioner if interventions unsuccessful or patient reports new pain   Outcome: Progressing      Problem: SAFETY ADULT  Goal: Patient will remain free of falls  INTERVENTIONS:  - Assess patient frequently for physical needs  -  Identify cognitive and physical deficits and behaviors that affect risk of falls    -  [place] fall precautions as indicated by assessment   - Educate patient/family on patient safety including physical limitations  - Instruct patient to call for assistance with activity based on assessment  - Modify environment to reduce risk of injury  - Consider OT/PT consult to assist with strengthening/mobility   Outcome: Progressing    Goal: Maintain or return to baseline ADL function  INTERVENTIONS:  -  Assess patient's ability to carry out ADLs; assess patient's baseline for ADL function and identify physical deficits which impact ability to perform ADLs (bathing, care of mouth/teeth, toileting, grooming, dressing, etc )  - Assess/evaluate cause of self-care deficits   - Assess range of motion  - Assess patient's mobility; develop plan if impaired  - Assess patient's need for assistive devices and provide as appropriate  - Encourage maximum independence but intervene and supervise when necessary  ¯ Involve family in performance of ADLs  ¯ Assess for home care needs following discharge   ¯ Request OT consult to assist with ADL evaluation and planning for discharge  ¯ Provide patient education as appropriate   Outcome: Progressing    Goal: Maintain or return mobility status to optimal level  INTERVENTIONS:  - Assess patient's baseline mobility status (ambulation, transfers, stairs, etc )    - Identify cognitive and physical deficits and behaviors that affect mobility  - Identify mobility aids required to assist with transfers and/or ambulation (gait belt, sit-to-stand, lift, walker, cane, etc )  - [place] fall precautions as indicated by assessment  - Record patient progress and toleration of activity level on Mobility SBAR; progress patient to next Phase/Stage  - Instruct patient to call for assistance with activity based on assessment  - Request Rehabilitation consult to assist with strengthening/weightbearing, etc    Outcome: Progressing      Problem: DISCHARGE PLANNING  Goal: Discharge to home or other facility with appropriate resources  INTERVENTIONS:  - Identify barriers to discharge w/patient and caregiver  - Arrange for needed discharge resources and transportation as appropriate  - Identify discharge learning needs (meds, wound care, etc )  - Arrange for interpretive services to assist at discharge as needed  - Refer to Case Management Department for coordinating discharge planning if the patient needs post-hospital services based on physician/advanced practitioner order or complex needs related to functional status, cognitive ability, or social support system   Outcome: Progressing      Problem: SELF HARM/SUICIDALITY  Goal: Will have no self-injury during hospital stay  INTERVENTIONS:  - Q 15 MINUTES: Routine safety checks  - Q WAKING SHIFT & PRN: Assess risk to determine if routine checks are adequate to maintain patient safety  - Encourage patient to participate actively in care by formulating a plan to combat response to suicidal ideation, identify supports and resources   Outcome: Progressing      Problem: DEPRESSION  Goal: Will be euthymic at discharge  INTERVENTIONS:  - Administer medication as ordered  - Provide emotional support via 1:1 interaction with staff  - Encourage involvement in milieu/groups/activities  - Monitor for social isolation   Outcome: Progressing      Problem: BEHAVIOR  Goal: Pt/Family maintain appropriate behavior and adhere to behavioral management agreement, if implemented  INTERVENTIONS:  - Assess the family dynamic   - Encourage verbalization of thoughts and concerns in a socially appropriate manner  - Assess patient/family's coping skills and non-compliant behavior (including use of illegal substances)  - Utilize positive, consistent limit setting strategies supporting safety of patient, staff and others  - Initiate consult with Case Management, Spiritual Care or other ancillary services as appropriate  - If a patient's/visitor's behavior jeopardizes the safety of the patient, staff, or others, refer to organization procedure     - Notify Security of behavior or suspected illegal substances which indicate the need for search of the patient and/or belongings  - Encourage participation in the decision making process about a behavioral management agreement; implement if patient meets criteria   Outcome: Progressing

## 2018-06-24 NOTE — NURSING NOTE
Patient appears to be sleeping and in no distress  Patient appears relaxed and calm  Patient shows no signs of self harm  Patient remains sp1 for safety  Will continue to monitor

## 2018-06-24 NOTE — NURSING NOTE
Patient is in the day room  Patient is calm, flat affect and pleasant  Patient denies any harmful behaviors or actions  Patient has no thoughts of suicide  Patient is compliant with meals, medications and personal care  Patient requires georgette lift and assistance of two  Patient remains sp1 for safety  Will continue to monitor

## 2018-06-24 NOTE — PLAN OF CARE
SELF CARE DEFICIT     Return ADL status to a safe level of function Not Progressing        SPIRITUAL CARE     Pt/Family able to move forward in process of forgiving self, others and/or higher power Not Progressing     Patient feels balance and connection with others and/or higher power that empowers the self during times of loss, guilt and fear Not Progressing          ANXIETY     Will report anxiety at manageable levels Progressing     By discharge: Patient will verbalize 2 strategies to deal with anxiety Progressing        BEHAVIOR     Pt/Family maintain appropriate behavior and adhere to behavioral management agreement, if implemented Progressing        DEPRESSION     Will be euthymic at discharge [address] Discharge to home or other facility with appropriate resources Progressing        Knowledge Deficit     Patient/family/caregiver demonstrates understanding of disease process, treatment plan, medications, and discharge instructions Progressing        PAIN - ADULT     Verbalizes/displays adequate comfort level or baseline comfort level Progressing        Prexisting or High Potential for Compromised Skin Integrity     Skin integrity is maintained or improved Progressing        SAFETY ADULT     Patient will remain free of falls Progressing     Maintain or return to baseline ADL function Progressing     Maintain or return mobility status to optimal level Progressing        SELF HARM/SUICIDALITY     Will have no self-injury during hospital stay [address] Will exhibit normal sleeping pattern Progressing        SUBSTANCE USE/ABUSE     Will have no detox symptoms and will verbalize plan for changing substance-related behavior Progressing     By discharge, will develop insight into their chemical dependency and sustain motivation to continue in recovery Progressing     By discharge, patient will have ongoing treatment plan addressing chemical dependency Progressing

## 2018-06-25 VITALS
HEART RATE: 85 BPM | WEIGHT: 220 LBS | TEMPERATURE: 96.5 F | HEIGHT: 70 IN | BODY MASS INDEX: 31.5 KG/M2 | SYSTOLIC BLOOD PRESSURE: 175 MMHG | RESPIRATION RATE: 17 BRPM | OXYGEN SATURATION: 94 % | DIASTOLIC BLOOD PRESSURE: 77 MMHG

## 2018-06-25 PROBLEM — N18.9 CHRONIC KIDNEY DISEASE: Status: ACTIVE | Noted: 2018-06-25

## 2018-06-25 PROBLEM — I10 HTN (HYPERTENSION): Status: ACTIVE | Noted: 2018-06-25

## 2018-06-25 PROBLEM — M19.90 OSTEOARTHRITIS: Status: ACTIVE | Noted: 2018-06-25

## 2018-06-25 PROBLEM — E03.8 SUBCLINICAL HYPOTHYROIDISM: Status: ACTIVE | Noted: 2018-06-25

## 2018-06-25 PROBLEM — E11.9 DM2 (DIABETES MELLITUS, TYPE 2) (HCC): Status: ACTIVE | Noted: 2018-06-25

## 2018-06-25 PROBLEM — R60.9 EDEMA: Status: ACTIVE | Noted: 2018-06-25

## 2018-06-25 PROBLEM — N39.0 UTI (URINARY TRACT INFECTION): Status: ACTIVE | Noted: 2018-06-25

## 2018-06-25 PROBLEM — N40.0 BPH (BENIGN PROSTATIC HYPERPLASIA): Status: ACTIVE | Noted: 2018-06-25

## 2018-06-25 PROBLEM — Z87.19 HISTORY OF GI BLEED: Status: ACTIVE | Noted: 2018-06-25

## 2018-06-25 PROBLEM — N40.0 BPH (BENIGN PROSTATIC HYPERPLASIA): Status: RESOLVED | Noted: 2018-06-25 | Resolved: 2018-06-25

## 2018-06-25 PROBLEM — K21.9 GERD (GASTROESOPHAGEAL REFLUX DISEASE): Status: ACTIVE | Noted: 2018-06-25

## 2018-06-25 PROBLEM — F01.50 VASCULAR DEMENTIA (HCC): Status: ACTIVE | Noted: 2018-06-25

## 2018-06-25 PROCEDURE — 97150 GROUP THERAPEUTIC PROCEDURES: CPT

## 2018-06-25 PROCEDURE — 99231 SBSQ HOSP IP/OBS SF/LOW 25: CPT | Performed by: NURSE PRACTITIONER

## 2018-06-25 RX ADMIN — AMLODIPINE BESYLATE 5 MG: 5 TABLET ORAL at 07:56

## 2018-06-25 RX ADMIN — ENOXAPARIN SODIUM 40 MG: 40 INJECTION SUBCUTANEOUS at 07:57

## 2018-06-25 NOTE — DISCHARGE SUMMARY
Psychiatric Discharge Summary    Discharge diagnosis:  Bipolar affective disorder, currently depressed, moderate ([facility] )    Secondary diagnoses:  Problem List     * (Principal)Bipolar affective disorder, currently depressed, moderate ([facility] )    BPH (benign prostatic hyperplasia)    Overview Deleted 6/25/2018  9:15 AM by TOBY Lantigua            HTN (hypertension)    Overview Deleted 6/25/2018  9:15 AM by TOBY Lantigua            Vascular dementia    Overview Deleted 6/25/2018  9:14 AM by TOBY Lantigua            Subclinical hypothyroidism    Overview Deleted 6/25/2018  9:14 AM by TOBY Lantigua            History of GI bleed    Overview Deleted 6/25/2018  9:14 AM by TOBY Lantigua            UTI (urinary tract infection)    Overview Deleted 6/25/2018  9:14 AM by TOBY Lantigua            DM2 (diabetes mellitus, type 2) ([place] [address] )    Overview Deleted 6/25/2018  9:13 AM by TOBY Lantigua            Lab Results   Component Value Date    HGBA1C 6 1 (H) 06/12/2018       No results for input(s): POCGLU in the last 72 hours  Blood Sugar Average: Last 72 hrs:          GERD (gastroesophageal reflux disease)    Overview Signed 6/25/2018  8:59 AM by TOBY Lantigua     Will continue protonix         Chronic kidney disease    Overview Deleted 6/25/2018  9:13 AM by TOBY Lantigua            Osteoarthritis    Overview Signed 6/25/2018  9:00 AM by TOBY Lantigua     Continue prn tylenol  Pt currently denies pain  Edema    Overview Deleted 6/25/2018  9:13 AM by TOBY Lantigua                  HPI:      The patient is a 76year old  male who was admitted to the Older Adult psychiatric unit under a 201 voluntary status to [facility] Meaghan [facility] service  The pt was referred by [facility] for suicidal threats  The pt would not disclose the plan  The pt was refusing his medications and not eating his ADA diet  He was nasty with staff and peers   He states people are lying to him  The pt has a history of mood lability and personality disorder with cognitive deficits  The pts thoughts are fragmented  The pt is irritable and easily agitated  The pt minimizes his suicidal thoughts and statements and states it was to get out of his facility  He believes they treat him like a child  The pt is 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  The pt states "No one knows more than me  "The pt was tearful at times discussing his wife who was at a different assisted living facility  Brief Hospital Course:     After the patient was admitted to the psychiatric unit  the patient was started on psychotropic medications  The patient refused multiple medication trials  The patients daughter visited an convinced the patient to take [place]  The patient agreed to take this medication and tolerated it well  Following this medication change, the patient started to stabilize  Finally on 6/25/2018, the patient was found to be stable for discharge  The patient is more pleasant with staff and is medication compliant  No suicidal or homicidal ideations are present  The patient expressed their readiness and willingness to be discharged and referral to outpatient treatment was made  The case was discussed with Dr Sruthi Burciaga and he has deemed the patient stable for discharge        Condition on discharge:     Improved    Medications Upon Discharge:     -  amLODIPine (NORVASC) tablet 5 mg, 5 mg, Oral, Daily, Provider Cutover, 5 mg at 06/25/18 0756  -  lurasidone (LATUDA) tablet 60 mg, 60 mg, Oral, Daily, Provider Cutover, 60 mg at 06/24/18 1732  -pantoprazole (PROTONIX) EC tablet 40 mg, 40 mg, Oral, Daily Before Breakfast, Provider Cutover, 40 mg at 06/23/18 0915        Michael Ureña PA-C

## 2018-06-25 NOTE — PROGRESS NOTES
Received patient resting in bed  Appears to be sleeping, respirations even and unlabored  No signs of distress  Remains isolative to self in room  Denies SIs at this time  Remains on SP1 precautions  Will continue to monitor for safety and support

## 2018-06-25 NOTE — OCCUPATIONAL THERAPY NOTE
Occupational Therapy Group Treatment Note      Julien Reynaga    6/25/2018    Patient Active Problem List   Diagnosis    Bipolar affective disorder, currently depressed, moderate ([facility] )    BPH (benign prostatic hyperplasia)    HTN (hypertension)    Vascular dementia    Subclinical hypothyroidism    History of GI bleed    UTI (urinary tract infection)    DM2 [facility]diabetes mellitus, type 2) ([facility])    GERD (gastroesophageal reflux disease)    Chronic kidney disease    Osteoarthritis    Edema       Past Medical History:   Diagnosis Date    Benign prostatic hyperplasia     Bipolar 1 disorder ([facility] )     BPH (benign prostatic hyperplasia)     CKD (chronic kidney disease)     Dementia     Difficulty walking     DM2 (diabetes mellitus, type 2) ([facility])     Dysphagia     Edema     Essential hypertension     GERD (gastroesophageal reflux disease)     GI bleed     Gout     HTN (hypertension)     Major depressive disorder     Metabolic encephalopathy     Neuropathy     Osteoarthritis     Personality disorder     Subclinical hypothyroidism     UTI (urinary tract infection)     Vascular dementia        No past surgical history on file  06/25/18 1110   Assessment   Assessment Carrie Parker was present for the full OT socialization session  He was alert, attentive to group process  He was pleasant on approach  He did assist with group activity  He did minimally engage in morning stretch exercises  He did interject relevant comments during current event discussion and "this day in history" discussion  His affect was friendly on approach  He was calm and and supportive of group process  He also discussed with the group relaxation strategies (when prompted)  Progress has been consistent towards his goal  Discontinue further OT assignment due to discharge from [facility] Madaline Grippe, OT

## 2018-06-25 NOTE — SOCIAL WORK
Patient is being discharged, no SI/HI, no psychosis or A/V   Patient is in agreement with discharge   No questions verbalized   Patient provided with after care appointment, discharge instructions and prescriptions   IMM, core measures, transition of care, discharge instructions, and treatment plan complete  Pt will be picked up by Rex LINDA at 2:00pm for a discharge back to [facility]   SW will continue to follow up as needed

## 2018-06-25 NOTE — OCCUPATIONAL THERAPY NOTE
Occupational Therapy Note  Evaluation has been completed and is in [place]  Please see Nicciarian for evaluation details    Rodolfo Hewitt, OT

## 2018-06-25 NOTE — PLAN OF CARE
ANXIETY     Will report anxiety at manageable levels Progressing     By discharge: Patient will verbalize 2 strategies to deal with anxiety Progressing        BEHAVIOR     Pt/Family maintain appropriate behavior and adhere to behavioral management agreement, if implemented Progressing        DEPRESSION     Will be euthymic at discharge [address] Discharge to home or other facility with appropriate resources Progressing        Knowledge Deficit     Patient/family/caregiver demonstrates understanding of disease process, treatment plan, medications, and discharge instructions Progressing        PAIN - ADULT     Verbalizes/displays adequate comfort level or baseline comfort level Progressing        Prexisting or High Potential for Compromised Skin Integrity     Skin integrity is maintained or improved Progressing        SAFETY ADULT     Patient will remain free of falls Progressing     Maintain or return to baseline ADL function Progressing     Maintain or return mobility status to optimal level Progressing        SELF CARE DEFICIT     Return ADL status to a safe level of function Progressing        SELF HARM/SUICIDALITY     Will have no self-injury during hospital stay Progressing        SLEEP DISTURBANCE     Will exhibit normal sleeping pattern Progressing        SPIRITUAL CARE     Pt/Family able to move forward in process of forgiving self, others and/or higher power Progressing     Patient feels balance and connection with others and/or higher power that empowers the self during times of loss, guilt and fear Progressing        SUBSTANCE USE/ABUSE     Will have no detox symptoms and will verbalize plan for changing substance-related behavior Progressing     By discharge, will develop insight into their chemical dependency and sustain motivation to continue in recovery Progressing     By discharge, patient will have ongoing treatment plan addressing chemical dependency Progressing

## 2018-06-25 NOTE — PROGRESS NOTES
VTE Pharmacologic Prophylaxis:   Pharmacologic: Enoxaparin (Lovenox)  Mechanical VTE Prophylaxis in Place: No    Patient Centered Rounds: I have evaluated patient without nursing staff present due to not required    Time Spent for Care: 15 minutes  More than 50% of total time spent on counseling and coordination of care as described above  Current Length of Stay: 15 day(s)    Current Patient Status: Inpatient Psych     Discharge Plan: home to SNF per Psychiatry    Code Status: Level 1 - Full Code      Subjective: Pt denied all complaints today  No CP, SOB, N/V/D, constipation or dysuria  Objective:     Vitals:   Temp (24hrs), Av 5 °F (35 8 °C), Min:96 5 °F (35 8 °C), Max:96 5 °F (35 8 °C)    HR:  [84-85] 85  Resp:  [17-19] 17  BP: (148-175)/(77-82) 175/77  SpO2:  [94 %-96 %] 94 %  Body mass index is 31 57 kg/m²  Physical Exam:     Physical Exam   Constitutional: He appears well-developed and well-nourished  HENT:   Head: Normocephalic and atraumatic  Cardiovascular: Normal rate, regular rhythm, normal heart sounds and intact distal pulses  Exam reveals no gallop and no friction rub  No murmur heard  Pulmonary/Chest: Effort normal and breath sounds normal  No respiratory distress  He has no wheezes  Abdominal: Soft  Bowel sounds are normal  He exhibits no distension  There is no tenderness  There is no rebound and no guarding  Musculoskeletal: He exhibits no edema  Neurological:   Alert to self  Waxes and wanes  Believes he is going home with his wife today  Confused to time and situation  Skin: Skin is warm and dry           Additional Data:     Labs:      Results from last 7 days  Lab Units 18  1305   WBC K/MCL 8 7   HEMOGLOBIN G/DL 14 3   HEMATOCRIT % 43 6   PLATELETS K/   NEUTROS PCT % 73*   LYMPHS PCT % 16*   MONOS PCT % 7   EOS PCT % 3       Results from last 7 days  Lab Units 18  0700   SODIUM MEQ/L 140   POTASSIUM MEQ/L 4 4   CHLORIDE MEQ/L 106   CO2 MMOL/L 24   BUN MG/DL 30*   CALCIUM MG/DL 9 9           Results from last 7 days  Lab Units 06/20/18  1110 06/19/18  1538 06/19/18  1134 06/19/18  0812 06/18/18  1950 06/18/18  1555 06/18/18  1059   POC GLUCOSE MG/* 108* 102* 99 132* 103* 147*             * I Have Reviewed All Lab Data Listed Above  * Additional Pertinent Lab Tests Reviewed: [address] [facility] Admission Reviewed          Last 24 Hours Medication List:     Current Facility-Administered Medications:  acetaminophen 325 mg Oral Q6H PRN Provider Cutover   amLODIPine 5 mg Oral Daily Provider Cutover   bisacodyl 10 mg Oral Daily PRN Provider Cutover   enoxaparin 40 mg Subcutaneous Q24H [facility] Provider Cutover   LORazepam 0 5 mg Intramuscular Q4H PRN Provider Cutover   LORazepam 0 5 mg Oral Q4H PRN Provider Cutover   lurasidone 60 mg Oral Daily Provider Cutover   magnesium hydroxide 30 mL Oral Daily PRN Provider Cutover   OLANZapine 5 mg Intramuscular Q4H PRN Provider Cutover   OLANZapine 5 mg Oral Q4H PRN Provider Cutover   pantoprazole 40 mg Oral Daily Before Breakfast Provider Cutover   sodium phosphate-biphosphate 1 enema Rectal Daily PRN Provider Cutover   zolpidem 5 mg Oral HS PRN Provider Cutover        Today, Patient Was Seen By: TOBY Eugene    ** Please Note: Dictation voice to text software may have been used in the creation of this document  **          Progress Note Ron Marquez 1940, 76 y o  male MRN: [id number]    Unit/Bed#: [facility]46 Encounter: [id number]    Primary Care Provider: Jeyson Jean Baptiste MD   Date and time admitted to hospital: 6/10/2018  6:34 PM        * Bipolar affective disorder, currently depressed, moderate ([address] )   Assessment & Plan    Management per psych        Vascular dementia   Assessment & Plan    Management per [facility]        HTN (hypertension)   Assessment & Plan    Pt  Was started on Norvasc 5mg daily, and blood pressures are improved          BPH (benign prostatic hyperplasia) Assessment & Plan    Patient is not on Flomax but had no urinary complaints  Edema   Assessment & Plan    No edema at this time  VDE was completed and negative  He has been on 40mg of SQ lovenox while admitted, but refused injections periodically  Chronic kidney disease   Assessment & Plan    Kidney function stable since admission  GERD (gastroesophageal reflux disease)   Assessment & Plan    Will continue protonix        DM2 (diabetes mellitus, type 2) ([facility] [address] )   Assessment & Plan    Lab Results   Component Value Date    HGBA1C 6 1 (H) 06/12/2018       No results for input(s): POCGLU in the last 72 hours  Blood Sugar Average: Last 72 hrs:   Blood sugars have been stable  He is allergic to neurontin and metformin  Not taking any meds at this time  See HgA1c 6 1         UTI (urinary tract infection)   Assessment & Plan    Treated with keflex and asymptomatic        History of GI bleed   Assessment & Plan    No active signs of bleeding  Hg stable  Subclinical hypothyroidism   Assessment & Plan    TSH< 10   T4 wnl  No treatment at this time  Continue to monitor

## 2018-06-25 NOTE — NURSING NOTE
Patient left unit with [employer], all his belongings and discharge paperwork  No issues noted with transport and discharge process

## 2018-06-25 NOTE — PROGRESS NOTES
Patient continues resting comfortable in bed  No signs of distress  Respirations even and unlabored  Remains on SP1 precautions  Will continue to monitor for safety and support

## 2018-06-25 NOTE — NURSING NOTE
Patient awake and alert this shift  No complaints of pain or discomfort  Patient denies suicidal ideation and no attempts at self harm noted  Patient is compliant with medications Norvasc and Lovenox  No other medications ordered at this time  Patient is set to be discharged back to [facility] this afternoon  Discharge Instructions completed to to be given to  to give to skilled nursing facility secondary to impaired cognition of patient  Transport to pick patient up at 1400  Patient's belongings packed up by staff and only consist of shirt and pants that he came in with  No issues noted at this time and patient to be discharged today at 1400

## 2018-06-25 NOTE — ASSESSMENT & PLAN NOTE
No edema at this time  VDE was completed and negative  He has been on 40mg of SQ lovenox while admitted, but refused injections periodically

## 2018-06-25 NOTE — ASSESSMENT & PLAN NOTE
Lab Results   Component Value Date    HGBA1C 6 1 (H) 06/12/2018       No results for input(s): POCGLU in the last 72 hours  Blood Sugar Average: Last 72 hrs:   Blood sugars have been stable  He is allergic to neurontin and metformin  Not taking any meds at this time  See HgA1c 6 1

## 2018-06-25 NOTE — DISCHARGE INSTR - APPOINTMENTS
Roseline Cedillo MD  PCP - General Internal Medicine [phone] [address]     Next Steps: Follow up      Instructions: Patient will see provider onsite following discharge from the hospital as needed  [facility]      [phone]     Next Steps: Follow up      Instructions: Patient will be seen onsite following discharge from the hospital on an as needed basis

## 2018-06-25 NOTE — PROGRESS NOTES
Patient remains on suicide precautions   Awake x 1 during the night , incontinent care provided   Appears  to be sleeping  Respirations non labored  No distress noted  Will continue to monitor  Georgette Ormond

## 2018-06-25 NOTE — PROGRESS NOTES
This patient, Laura Lopez, is suffering from from vascular dementia which is causing most of his psychiatric symptoms, He also has a dx of bipolar disorder  He is  A Level One patient and does not need a Level Two evaluation